# Patient Record
Sex: FEMALE | Race: BLACK OR AFRICAN AMERICAN | NOT HISPANIC OR LATINO
[De-identification: names, ages, dates, MRNs, and addresses within clinical notes are randomized per-mention and may not be internally consistent; named-entity substitution may affect disease eponyms.]

---

## 2023-04-10 ENCOUNTER — APPOINTMENT (OUTPATIENT)
Dept: ORTHOPEDIC SURGERY | Facility: CLINIC | Age: 53
End: 2023-04-10
Payer: COMMERCIAL

## 2023-04-10 VITALS — WEIGHT: 195 LBS | HEIGHT: 63 IN | BODY MASS INDEX: 34.55 KG/M2

## 2023-04-10 PROBLEM — Z00.00 ENCOUNTER FOR PREVENTIVE HEALTH EXAMINATION: Status: ACTIVE | Noted: 2023-04-10

## 2023-04-10 PROCEDURE — 20610 DRAIN/INJ JOINT/BURSA W/O US: CPT | Mod: RT

## 2023-04-10 PROCEDURE — 73010 X-RAY EXAM OF SHOULDER BLADE: CPT | Mod: 50

## 2023-04-10 PROCEDURE — 73030 X-RAY EXAM OF SHOULDER: CPT | Mod: 50

## 2023-04-10 PROCEDURE — 99203 OFFICE O/P NEW LOW 30 MIN: CPT | Mod: 25

## 2023-04-10 NOTE — IMAGING
[de-identified] : Shoulder (R / L)\par Normal muscle tone/ bulk\par Nontender to palpation throughout\par  / 170\par Abd 80 / 80\par ER at side 40 / 60\par IR L3 / T10\par SILT throughout

## 2023-04-10 NOTE — DATA REVIEWED
[FreeTextEntry1] : 4 views of the bilateral shoulders were obtained in the office today demonstrating mild to moderate glenohumeral arthritis with subchondral sclerosis and joint space narrowing on the right.

## 2023-04-10 NOTE — DISCUSSION/SUMMARY
[Medication Risks Reviewed] : Medication risks reviewed [Surgical risks reviewed] : Surgical risks reviewed [de-identified] : - discussed etiology/ pathophysiology of glenohumeral arthritis\par - reviewed treatment options, conservative and operative\par - reviewed r/b/a to these\par - activity modifications\par - patient requests corticosteroid injection today\par - NSAIDs prn pain\par - f/u in 2 weeks for repeat exam

## 2023-04-10 NOTE — HISTORY OF PRESENT ILLNESS
[6] : 6 [Dull/Aching] : dull/aching [Throbbing] : throbbing [] : yes [Constant] : constant [Bending forward] : bending forward [Extending back] : extending back [de-identified] : 4/10/23: 53 y/o RHD F presenting with bilateral shoulder pain X 2 months. R>L. No specific injury. Pain is mostly at night. Does not take anything for pain. Exacerbated by movement. No numbness or tingling.  She has a history of adhesive capsulitis 6 years prior, had CSI with good relief.  She is a retired . [FreeTextEntry5] :  EKATERINA 52 year F is here for Bilateral Shoulders, states approx. 2 months ago started having pain no injury occurred.Pt states when sleeping and extend the Bilateral Shoulder is when  the pain starts,feeling throbbing feeling when laying on her Bilateral Shoulder is having radiating pain on her Bilateral Forearm when twisting a certain way.States about 6 year ago she had frozen Right Shoulder which she had got a CSI which helped with the pain

## 2023-04-10 NOTE — PROCEDURE
[FreeTextEntry1] : Corticosteroid injection [FreeTextEntry2] : Right glenohumeral osteoarthritis [FreeTextEntry3] : The risks and benefits of steroid injections were discussed.\par Verbal consent was obtained, \par The site was prepped in a sterile fashion with alcohol\par A combination of 40 mg (1cc) of Kenalog and 2cc 1% xylocaine were injected under sterile conditions at the level of the right glenohumeral joint\par Patient tolerated the procedure without complication and was counseled on post injection expectations.

## 2023-05-01 ENCOUNTER — APPOINTMENT (OUTPATIENT)
Dept: ORTHOPEDIC SURGERY | Facility: CLINIC | Age: 53
End: 2023-05-01

## 2023-05-08 ENCOUNTER — APPOINTMENT (OUTPATIENT)
Dept: ORTHOPEDIC SURGERY | Facility: CLINIC | Age: 53
End: 2023-05-08
Payer: COMMERCIAL

## 2023-05-08 DIAGNOSIS — M19.019 PRIMARY OSTEOARTHRITIS, UNSPECIFIED SHOULDER: ICD-10-CM

## 2023-05-08 DIAGNOSIS — S46.019A STRAIN OF MUSCLE(S) AND TENDON(S) OF THE ROTATOR CUFF OF UNSPECIFIED SHOULDER, INITIAL ENCOUNTER: ICD-10-CM

## 2023-05-08 PROCEDURE — 99212 OFFICE O/P EST SF 10 MIN: CPT

## 2023-05-09 PROBLEM — M19.019 GLENOHUMERAL ARTHRITIS: Status: ACTIVE | Noted: 2023-04-10

## 2023-05-09 NOTE — IMAGING
[de-identified] : Full neck range of motion\par - Spurling's\par - Walton's\par \par Shoulder (R / L)\par Normal muscle tone/ bulk\par TTP at lesser tuberosity\par  / 170\par Abd 80 / 80\par ER at side 60 / 60\par IR L1 / T10\par SILT throughout\par - Slim's\par - Belly Press\par + Hawkin's \par - Savage's

## 2023-05-09 NOTE — DISCUSSION/SUMMARY
[Medication Risks Reviewed] : Medication risks reviewed [Surgical risks reviewed] : Surgical risks reviewed [de-identified] : - discussed etiology/ pathophysiology of her complaints in layman's terms\par - reviewed treatment options, conservative and operative\par - reviewed r/b/a to these\par - activity modifications\par - PT Rx\par - NSAIDs prn pain\par - f/u in 1 month [employer]

## 2023-05-09 NOTE — HISTORY OF PRESENT ILLNESS
[6] : 6 [Dull/Aching] : dull/aching [Throbbing] : throbbing [] : yes [Constant] : constant [Bending forward] : bending forward [Extending back] : extending back [de-identified] : 4/10/23: 53 y/o RHD F presenting with bilateral shoulder pain X 2 months. R>L. No specific injury. Pain is mostly at night. Does not take anything for pain. Exacerbated by movement. No numbness or tingling.  She has a history of adhesive capsulitis 6 years prior, had CSI with good relief.  She is a retired .\par \par 5/8/23: Patient returns for follow-up.  She received a steroid injection in the right shoulder at her last visit.  She believes that it helped somewhat but she continues to have pain with activities.

## 2023-06-19 ENCOUNTER — APPOINTMENT (OUTPATIENT)
Dept: ORTHOPEDIC SURGERY | Facility: CLINIC | Age: 53
End: 2023-06-19